# Patient Record
Sex: MALE | Race: WHITE | NOT HISPANIC OR LATINO | URBAN - METROPOLITAN AREA
[De-identification: names, ages, dates, MRNs, and addresses within clinical notes are randomized per-mention and may not be internally consistent; named-entity substitution may affect disease eponyms.]

---

## 2022-01-27 ENCOUNTER — APPOINTMENT (RX ONLY)
Dept: URBAN - METROPOLITAN AREA CLINIC 173 | Facility: CLINIC | Age: 67
Setting detail: DERMATOLOGY
End: 2022-01-27

## 2022-01-27 DIAGNOSIS — Z41.9 ENCOUNTER FOR PROCEDURE FOR PURPOSES OTHER THAN REMEDYING HEALTH STATE, UNSPECIFIED: ICD-10-CM

## 2022-01-27 PROCEDURE — ? PULSED-DYE LASER

## 2022-01-27 PROCEDURE — ? COSMETIC CONSULTATION - RED SPOTS

## 2022-01-27 NOTE — PROCEDURE: PULSED-DYE LASER
Delay Time (Ms): 20
Post-Care Instructions: I reviewed with the patient in detail post-care instructions. Patient should stay away from the sun and wear sun protection until treated areas are fully healed.
Delay Time (Ms): 20
Post-Procedure Care: Vaseline and ice applied. Post care reviewed with patient.
Spot Size: 7 mm
Pulse Duration: 10 ms
Cryogen Time (Ms): 30
Cryogen Time (Ms): 30
Detail Level: Zone
Fluence In J/Cm2 (Optional): 12.5
Price (Use Numbers Only, No Special Characters Or $): 400
Pulse Count (Optional): 85
Laser Type: Vbeam 595nm
Consent: Written consent obtained, risks reviewed including but not limited to crusting, scabbing, blistering, scarring, darker or lighter pigmentary change, incidental hair removal, bruising, and/or incomplete removal.
Location (Required For Details To Render In Note But Body Touch Will Also Count For First Location): lower face

## 2022-01-27 NOTE — HPI: COSMETIC CONSULTATION
Additional History: Patient states that he had two CoolGlide treatments approximately [address] Alejandre.

## 2022-09-15 ENCOUNTER — APPOINTMENT (RX ONLY)
Dept: URBAN - METROPOLITAN AREA CLINIC 173 | Facility: CLINIC | Age: 67
Setting detail: DERMATOLOGY
End: 2022-09-15

## 2022-09-15 DIAGNOSIS — Z41.9 ENCOUNTER FOR PROCEDURE FOR PURPOSES OTHER THAN REMEDYING HEALTH STATE, UNSPECIFIED: ICD-10-CM

## 2022-09-15 PROCEDURE — ? DIAGNOSIS COMMENT

## 2022-09-15 PROCEDURE — ? PULSED-DYE LASER

## 2022-09-15 ASSESSMENT — LOCATION ZONE DERM: LOCATION ZONE: NOSE

## 2022-09-15 ASSESSMENT — LOCATION DETAILED DESCRIPTION DERM: LOCATION DETAILED: NASAL DORSUM

## 2022-09-15 ASSESSMENT — LOCATION SIMPLE DESCRIPTION DERM: LOCATION SIMPLE: NOSE

## 2022-09-15 NOTE — PROCEDURE: PULSED-DYE LASER
Delay Time (Ms): 20
Post-Care Instructions: I reviewed with the patient in detail post-care instructions. Patient should stay away from the sun and wear sun protection until treated areas are fully healed.
Delay Time (Ms): 20
Immediate Endpoint: erythema
Post-Procedure Care: Vaseline and ice applied. Post care reviewed with patient.
Treated Area: small area
Spot Size: 10 mm
Spot Size: 7 mm
Pulse Duration: 10 ms
Cryogen Time (Ms): 30
Cryogen Time (Ms): 30
Pulse Duration: 6 ms
Location Override: nose
Detail Level: Zone
Fluence In J/Cm2 (Optional): 13.5
Price (Use Numbers Only, No Special Characters Or $): 400
Spot Size: 10x3 mm
Laser Type: Vbeam 595nm
Treated Area: medium area
Cryogen Time (Ms): 30
Pulse Duration: 30 ms
Consent: Written consent obtained, risks reviewed including but not limited to crusting, scabbing, blistering, scarring, darker or lighter pigmentary change, incidental hair removal, bruising, and/or incomplete removal.
Location Override: cheeks, chin, nose
Fluence In J/Cm2 (Optional): 7.75
Pulse Count (Optional): 72

## 2022-09-15 NOTE — PROCEDURE: DIAGNOSIS COMMENT
Detail Level: Simple
Comment: Some improvement after first treatment. Will re-treat again today.
Render Risk Assessment In Note?: no

## 2022-12-08 ENCOUNTER — APPOINTMENT (RX ONLY)
Dept: URBAN - METROPOLITAN AREA CLINIC 173 | Facility: CLINIC | Age: 67
Setting detail: DERMATOLOGY
End: 2022-12-08

## 2022-12-08 DIAGNOSIS — Z41.9 ENCOUNTER FOR PROCEDURE FOR PURPOSES OTHER THAN REMEDYING HEALTH STATE, UNSPECIFIED: ICD-10-CM

## 2022-12-08 PROCEDURE — ? DIAGNOSIS COMMENT

## 2022-12-08 PROCEDURE — ? PULSED-DYE LASER

## 2022-12-08 NOTE — PROCEDURE: DIAGNOSIS COMMENT
Detail Level: Simple
Render Risk Assessment In Note?: no
Comment: Third treatment today. Overall improved. RTC in 1 year if needed

## 2022-12-08 NOTE — PROCEDURE: PULSED-DYE LASER
Spot Size: 7 mm
Post-Care Instructions: I reviewed with the patient in detail post-care instructions. Patient should stay away from the sun and wear sun protection until treated areas are fully healed.
Delay Time (Ms): 20
Delay Time (Ms): 20
Treated Area: medium area
Spot Size: 10x3 mm
Laser Type: Vbeam 595nm
Immediate Endpoint: erythema
Pulse Count (Optional): 88
Pulse Duration: 10 ms
Pulse Duration: 30 ms
Cryogen Time (Ms): 30
Cryogen Time (Ms): 30
Fluence In J/Cm2 (Optional): 7.75
Location Override: nose, cheeks, chin
Post-Procedure Care: Ice applied after treatment. Post care reviewed with patient.
Spot Size: 10 mm
Pulse Duration: 6 ms
Consent: Written consent obtained, risks reviewed including but not limited to crusting, scabbing, blistering, scarring, darker or lighter pigmentary change, incidental hair removal, bruising, and/or incomplete removal.
Pulse Count (Optional): 40
Detail Level: Zone
Fluence In J/Cm2 (Optional): 14.00
Price (Use Numbers Only, No Special Characters Or $): 402